# Patient Record
(demographics unavailable — no encounter records)

---

## 2024-10-31 NOTE — ASSESSMENT
[FreeTextEntry1] : #Atopic dermatitis, bilateral dorsal hands, <20% BSA, chronic, flaring - We have discussed the nature and course of this condition - START Opzelura BID to affected areas as maintenance topical - C/w triamcinolone and halobetasol ointments for breakthrough - discussed not using these medications too often to prevent loss of efficacy - Proper topical steroid use and side effects discussed, including cutaneous atrophy, telangiectasias, and striae. Avoid long-term use.  #Multiple benign nevi - Counseled patient on benign clinical findings - Reassurance provided - no treatment indicated at this time  #History of dysplastic nevus, L flank  - No evidence of recurrence - C/w yearly FBSE for monitoring    #Skin cancer screening Full body exam today. Benign findings as above. - Patient educated on ABCDEs of melanoma screening - Photoprotection reviewed including sun-protective behaviors, protective clothing, and the use of OTC broad-spectrum SPF 30+ sunscreens was advised. - RTC if develops lesions that are new, symptomatic (bleeding, itching), changing in size/color/shape  RTC 1 year for annual FBSE

## 2024-10-31 NOTE — HISTORY OF PRESENT ILLNESS
[FreeTextEntry1] : F/u moles [de-identified] : Mr. NA SANCHEZ is a 30-year-old male who presents for:    1) Moles  - Spots of concern: L index finger with hyperpigmentation  2) Hand dermatitis - Uses triamcinolone and halobetasol (rx'd by PCP) and cotton gloves at night with Eucerin and Aquaphor cream  - Has atopic triad  - Eczema worsens in winter usually    Derm Hx: dysplastic nevus on L flank s/p excision, 2017; no personal hx of skin cancer Family Hx: maternal grandfather and great-uncle with MM Social Hx: PA in [facility]

## 2024-10-31 NOTE — PHYSICAL EXAM
[Alert] : alert [Oriented x 3] : ~L oriented x 3 [Well Nourished] : well nourished [Conjunctiva Non-injected] : conjunctiva non-injected [No Visual Lymphadenopathy] : no visual  lymphadenopathy [No Clubbing] : no clubbing [No Edema] : no edema [No Bromhidrosis] : no bromhidrosis [No Chromhidrosis] : no chromhidrosis [FreeTextEntry3] : Full body skin exam was performed including oropharynx, scalp and nails, with the exception of: lymph nodes, fingernails and toenails (due to gel nail polish)  - Scattered tan to light brown macules on trunk and extremities with no concerning features on dermoscopy - Well-healed linear scar on L flank - Yellowish hyperkeratotic plaques with few fissures on hand interdigital web spaces and distal fingertips